# Patient Record
Sex: FEMALE | Race: BLACK OR AFRICAN AMERICAN | NOT HISPANIC OR LATINO | Employment: OTHER | URBAN - METROPOLITAN AREA
[De-identification: names, ages, dates, MRNs, and addresses within clinical notes are randomized per-mention and may not be internally consistent; named-entity substitution may affect disease eponyms.]

---

## 2024-03-02 DIAGNOSIS — K21.9 GASTROESOPHAGEAL REFLUX DISEASE, UNSPECIFIED WHETHER ESOPHAGITIS PRESENT: ICD-10-CM

## 2024-03-04 RX ORDER — PANTOPRAZOLE SODIUM 40 MG/1
TABLET, DELAYED RELEASE ORAL
Qty: 100 TABLET | Refills: 2 | Status: SHIPPED | OUTPATIENT
Start: 2024-03-04

## 2024-03-18 ENCOUNTER — OFFICE VISIT (OUTPATIENT)
Dept: OBSTETRICS AND GYNECOLOGY | Facility: CLINIC | Age: 69
End: 2024-03-18
Payer: MEDICARE

## 2024-03-18 VITALS
DIASTOLIC BLOOD PRESSURE: 90 MMHG | SYSTOLIC BLOOD PRESSURE: 130 MMHG | BODY MASS INDEX: 28 KG/M2 | WEIGHT: 158 LBS | HEIGHT: 63 IN

## 2024-03-18 DIAGNOSIS — Z01.419 WELL WOMAN EXAM WITH ROUTINE GYNECOLOGICAL EXAM: Primary | ICD-10-CM

## 2024-03-18 DIAGNOSIS — Z01.419 ENCOUNTER FOR ANNUAL ROUTINE GYNECOLOGICAL EXAMINATION: ICD-10-CM

## 2024-03-18 PROCEDURE — 99397 PER PM REEVAL EST PAT 65+ YR: CPT | Performed by: OBSTETRICS & GYNECOLOGY

## 2024-03-18 PROCEDURE — 1126F AMNT PAIN NOTED NONE PRSNT: CPT | Performed by: OBSTETRICS & GYNECOLOGY

## 2024-03-18 ASSESSMENT — ENCOUNTER SYMPTOMS
GASTROINTESTINAL NEGATIVE: 0
HEMATOLOGIC/LYMPHATIC NEGATIVE: 0
PSYCHIATRIC NEGATIVE: 0
NEUROLOGICAL NEGATIVE: 0
MUSCULOSKELETAL NEGATIVE: 0
ENDOCRINE NEGATIVE: 0
RESPIRATORY NEGATIVE: 0
ALLERGIC/IMMUNOLOGIC NEGATIVE: 0
CARDIOVASCULAR NEGATIVE: 0
EYES NEGATIVE: 0
CONSTITUTIONAL NEGATIVE: 0

## 2024-03-18 ASSESSMENT — PAIN SCALES - GENERAL: PAINLEVEL: 0-NO PAIN

## 2024-06-29 DIAGNOSIS — I10 HYPERTENSION, UNSPECIFIED TYPE: ICD-10-CM

## 2024-07-01 RX ORDER — AMLODIPINE BESYLATE 2.5 MG/1
2.5 TABLET ORAL DAILY
Qty: 100 TABLET | Refills: 2 | Status: SHIPPED | OUTPATIENT
Start: 2024-07-01

## 2024-07-10 ENCOUNTER — LAB (OUTPATIENT)
Dept: LAB | Facility: LAB | Age: 69
End: 2024-07-10
Payer: MEDICARE

## 2024-07-10 ENCOUNTER — APPOINTMENT (OUTPATIENT)
Dept: GASTROENTEROLOGY | Facility: CLINIC | Age: 69
End: 2024-07-10
Payer: MEDICARE

## 2024-07-10 VITALS — HEART RATE: 66 BPM | BODY MASS INDEX: 28.53 KG/M2 | OXYGEN SATURATION: 98 % | HEIGHT: 63 IN | WEIGHT: 161 LBS

## 2024-07-10 DIAGNOSIS — R79.89 ELEVATED LIVER FUNCTION TESTS: Primary | ICD-10-CM

## 2024-07-10 DIAGNOSIS — R79.89 ELEVATED LIVER FUNCTION TESTS: ICD-10-CM

## 2024-07-10 DIAGNOSIS — K73.8: ICD-10-CM

## 2024-07-10 DIAGNOSIS — K73.9 CHRONIC HEPATITIS, UNSPECIFIED (MULTI): ICD-10-CM

## 2024-07-10 LAB
AFP SERPL-MCNC: <4 NG/ML (ref 0–9)
CERULOPLASMIN SERPL-MCNC: 33.2 MG/DL (ref 20–60)
HAV AB SER QL IA: REACTIVE
HBV CORE AB SER QL: NONREACTIVE
HBV SURFACE AB SER-ACNC: 10.4 MIU/ML
HBV SURFACE AG SERPL QL IA: NONREACTIVE
HCV AB SER QL: NONREACTIVE
INR PPP: 0.9 (ref 0.9–1.1)
PROTHROMBIN TIME: 10.4 SECONDS (ref 9.8–12.8)

## 2024-07-10 PROCEDURE — 86381 MITOCHONDRIAL ANTIBODY EACH: CPT

## 2024-07-10 PROCEDURE — 83516 IMMUNOASSAY NONANTIBODY: CPT

## 2024-07-10 PROCEDURE — 85610 PROTHROMBIN TIME: CPT

## 2024-07-10 PROCEDURE — 1159F MED LIST DOCD IN RCRD: CPT | Performed by: INTERNAL MEDICINE

## 2024-07-10 PROCEDURE — 99214 OFFICE O/P EST MOD 30 MIN: CPT | Performed by: INTERNAL MEDICINE

## 2024-07-10 PROCEDURE — 36415 COLL VENOUS BLD VENIPUNCTURE: CPT

## 2024-07-10 PROCEDURE — 81256 HFE GENE: CPT

## 2024-07-10 PROCEDURE — 1036F TOBACCO NON-USER: CPT | Performed by: INTERNAL MEDICINE

## 2024-07-10 PROCEDURE — 82390 ASSAY OF CERULOPLASMIN: CPT

## 2024-07-10 PROCEDURE — 86706 HEP B SURFACE ANTIBODY: CPT

## 2024-07-10 PROCEDURE — 82784 ASSAY IGA/IGD/IGG/IGM EACH: CPT

## 2024-07-10 PROCEDURE — 86015 ACTIN ANTIBODY EACH: CPT

## 2024-07-10 PROCEDURE — 82105 ALPHA-FETOPROTEIN SERUM: CPT

## 2024-07-10 PROCEDURE — 86704 HEP B CORE ANTIBODY TOTAL: CPT

## 2024-07-10 PROCEDURE — 86038 ANTINUCLEAR ANTIBODIES: CPT

## 2024-07-10 PROCEDURE — 86803 HEPATITIS C AB TEST: CPT

## 2024-07-10 PROCEDURE — 87340 HEPATITIS B SURFACE AG IA: CPT

## 2024-07-10 PROCEDURE — 86708 HEPATITIS A ANTIBODY: CPT

## 2024-07-10 RX ORDER — ASPIRIN 81 MG/1
TABLET ORAL EVERY 24 HOURS
COMMUNITY

## 2024-07-10 RX ORDER — EZETIMIBE/ATORVASTATIN CALCIUM 10 MG-10MG
TABLET ORAL
COMMUNITY
Start: 2022-03-07

## 2024-07-10 RX ORDER — EZETIMIBE 10 MG/1
1 TABLET ORAL DAILY
COMMUNITY
Start: 2023-08-07

## 2024-07-10 RX ORDER — CHOLECALCIFEROL (VITAMIN D3) 125 MCG
CAPSULE ORAL
COMMUNITY

## 2024-07-10 RX ORDER — ALIROCUMAB 75 MG/ML
INJECTION, SOLUTION SUBCUTANEOUS
COMMUNITY
Start: 2023-01-05

## 2024-07-10 RX ORDER — SEMAGLUTIDE 0.68 MG/ML
INJECTION, SOLUTION SUBCUTANEOUS
COMMUNITY
Start: 2023-11-20

## 2024-07-10 RX ORDER — ROSUVASTATIN CALCIUM 40 MG/1
TABLET, COATED ORAL
COMMUNITY
Start: 2023-08-21

## 2024-07-10 RX ORDER — ZOLPIDEM TARTRATE 5 MG/1
TABLET ORAL DAILY PRN
COMMUNITY
Start: 2024-01-11

## 2024-07-10 ASSESSMENT — ENCOUNTER SYMPTOMS
LOSS OF SENSATION IN FEET: 0
DEPRESSION: 0
OCCASIONAL FEELINGS OF UNSTEADINESS: 0

## 2024-07-10 NOTE — PROGRESS NOTES
Subjective     History of Present Illness:     67 yo with h/o Sjogrens, Lyme disease, h/o eosinophilic esophagitis, trigeminal neuralgia seen for elevated liver enzymes. Previously seen for constipation and GERD. On pantoprazole 40 mg daily and metamucil daily.     Recent CMP at [facility] show mild elevation in transaminases   2/1/2023- ALT 42, AST 44;   6/2023 AST 36, ALT 40;   4/2024 AST 49, ALT 72.    RUQ US 4/24/2024 normal, hepatic cyst seen.  Previously normal CMP in 5/2022.  F evaluation of ferritin 6/21/24 elevated at 317, repeat on 7/5/2024  261. Iron studies 4/2024- iron 71, tbic 269, transferrin sat 26.4.  TSH wnl  No previous history of liver diseae. No jaundice, abdominal distention, rectal bleeding or melena.   No otc herbal supplements, only vitamins, chondroitin, fish oil, none of which new.  Started estroven yesterday for hot flashes.  Previous medication changes notable for Praluent that was started in Jan 2023. Also Crestor at that time but was on alternate statin prior to this.     No family history of liver disease  1-2 etoh per week  No illicit drugs current or past  No prior blood transfusions.     colonoscopy 2019- diverticulosis, int hemorrhoids  EGD 12/2017- negative Eoe, normal gastric biopsies  colonoscopy 8/2019- diverticulosis, hemorrhoids    Allergies  Allergies   Allergen Reactions    Azithromycin Hives    Bactrim [Sulfamethoxazole-Trimethoprim] Hives       Medications  Current Outpatient Medications   Medication Instructions    amLODIPine (NORVASC) 2.5 mg, oral, Daily    pantoprazole (ProtoNix) 40 mg EC tablet TAKE 1 TABLET BY MOUTH EVERY  MORNING . TAKE BEFORE MEAL        Objective   There were no vitals taken for this visit.   Physical Exam  Vitals reviewed.   Constitutional:       General: She is awake.   Cardiovascular:      Rate and Rhythm: Normal rate and regular rhythm.   Pulmonary:      Effort: Pulmonary effort is normal.      Breath sounds: Normal breath sounds.   Abdominal:       General: Bowel sounds are normal.      Palpations: Abdomen is soft.      Tenderness: There is no abdominal tenderness.   Neurological:      Mental Status: She is alert and oriented to person, place, and time.   Psychiatric:         Attention and Perception: Attention and perception normal.         Behavior: Behavior normal.               Assessment/Plan:    67 yo with h/o Sjogrens, Lyme disease, h/o eosinophilic esophagitis, trigeminal neuralgia seen for elevated transaminases. No symptoms of chronic liver disease and RUQ US negative for hepatic steatosis or other structural findings.  She has been on statins chronically with no previous elevation in liver tests. Notable elevated ferritin with normal transferrin saturation. Will check HFE genes for hemochromatosis as well as other chronic liver disease labs.  Medication review shows new medication Praluent that was started 4-6 weeks prior to first enzyme elevation in Feb of 2023.  Reviewe of livertox.nih.gov indicates cases of transaminitis with this medication, at times up to 3 x upper limit of normal. With limited data due to the drug being new, we have no evidence of clinically significant hepatitis with its use thus far.  If all other testing is negative, suspect praluent to be cause of transaminitis and continuous monitoring  every 6 mo if stable.    Rec  Check hepatic function in 3 mo  Check hfe gene  Check chronic liver disease labs  Follow up in Oct  Colonoscopy due this year for surveillance, will schedule        Ness Stuart MD

## 2024-07-11 DIAGNOSIS — R79.89 ELEVATED LIVER FUNCTION TESTS: Primary | ICD-10-CM

## 2024-07-11 LAB — IGA SERPL-MCNC: <7 MG/DL (ref 70–400)

## 2024-07-12 LAB
ANA PATTERN: ABNORMAL
ANA SER QL HEP2 SUBST: POSITIVE
ANA TITR SER IF: ABNORMAL {TITER}
GLIADIN PEPTIDE IGG SER IA-ACNC: 1.28 FLU (ref 0–4.99)
MITOCHONDRIA AB SER QL IF: NEGATIVE
SMOOTH MUSCLE AB SER QL IF: NEGATIVE
TTG IGG SER IA-ACNC: <0.82 FLU (ref 0–4.99)

## 2024-07-15 LAB
ELECTRONICALLY SIGNED BY: NORMAL
HFE GENE MUT TESTED BLD/T: NORMAL
HFE P.C282Y BLD/T QL: NORMAL
HFE P.H63D BLD/T QL: NORMAL

## 2024-08-14 DIAGNOSIS — D80.2 SELECTIVE DEFICIENCY OF IMMUNOGLOBULIN A (IGA) (MULTI): Primary | ICD-10-CM

## 2024-09-24 ENCOUNTER — APPOINTMENT (OUTPATIENT)
Dept: GASTROENTEROLOGY | Facility: EXTERNAL LOCATION | Age: 69
End: 2024-09-24
Payer: MEDICARE

## 2024-09-24 DIAGNOSIS — Z12.11 SPECIAL SCREENING FOR MALIGNANT NEOPLASMS, COLON: Primary | ICD-10-CM

## 2024-09-24 DIAGNOSIS — R79.89 ELEVATED LIVER FUNCTION TESTS: ICD-10-CM

## 2024-09-24 DIAGNOSIS — Z80.0 FAMILY HISTORY OF MALIGNANT NEOPLASM OF GASTROINTESTINAL TRACT: ICD-10-CM

## 2024-09-24 PROCEDURE — G0105 COLORECTAL SCRN; HI RISK IND: HCPCS | Performed by: INTERNAL MEDICINE

## 2024-10-01 ENCOUNTER — LAB (OUTPATIENT)
Dept: LAB | Facility: LAB | Age: 69
End: 2024-10-01
Payer: MEDICARE

## 2024-10-01 DIAGNOSIS — R79.89 ELEVATED LIVER FUNCTION TESTS: ICD-10-CM

## 2024-10-01 LAB
ALBUMIN SERPL BCP-MCNC: 4.4 G/DL (ref 3.4–5)
ALP SERPL-CCNC: 68 U/L (ref 33–136)
ALT SERPL W P-5'-P-CCNC: 29 U/L (ref 7–45)
AST SERPL W P-5'-P-CCNC: 30 U/L (ref 9–39)
BILIRUB DIRECT SERPL-MCNC: 0.1 MG/DL (ref 0–0.3)
BILIRUB SERPL-MCNC: 0.5 MG/DL (ref 0–1.2)
PROT SERPL-MCNC: 7.6 G/DL (ref 6.4–8.2)

## 2024-10-01 PROCEDURE — 36415 COLL VENOUS BLD VENIPUNCTURE: CPT

## 2024-10-01 PROCEDURE — 80076 HEPATIC FUNCTION PANEL: CPT

## 2024-12-03 ENCOUNTER — PATIENT MESSAGE (OUTPATIENT)
Dept: OBSTETRICS AND GYNECOLOGY | Facility: CLINIC | Age: 69
End: 2024-12-03
Payer: MEDICARE

## 2024-12-03 DIAGNOSIS — Z12.31 BREAST CANCER SCREENING BY MAMMOGRAM: ICD-10-CM

## 2024-12-23 ENCOUNTER — APPOINTMENT (OUTPATIENT)
Dept: ALLERGY | Facility: CLINIC | Age: 69
End: 2024-12-23
Payer: MEDICARE

## 2025-03-24 ENCOUNTER — APPOINTMENT (OUTPATIENT)
Dept: OBSTETRICS AND GYNECOLOGY | Facility: CLINIC | Age: 70
End: 2025-03-24
Payer: MEDICARE